# Patient Record
Sex: MALE | Race: WHITE | NOT HISPANIC OR LATINO | URBAN - METROPOLITAN AREA
[De-identification: names, ages, dates, MRNs, and addresses within clinical notes are randomized per-mention and may not be internally consistent; named-entity substitution may affect disease eponyms.]

---

## 2025-07-04 ENCOUNTER — OFFICE VISIT (OUTPATIENT)
Dept: URGENT CARE | Age: 20
End: 2025-07-04
Payer: COMMERCIAL

## 2025-07-04 VITALS
HEART RATE: 74 BPM | OXYGEN SATURATION: 98 % | TEMPERATURE: 98.4 F | RESPIRATION RATE: 16 BRPM | SYSTOLIC BLOOD PRESSURE: 144 MMHG | DIASTOLIC BLOOD PRESSURE: 80 MMHG

## 2025-07-04 DIAGNOSIS — R68.89 FLU-LIKE SYMPTOMS: ICD-10-CM

## 2025-07-04 DIAGNOSIS — J02.9 ACUTE PHARYNGITIS, UNSPECIFIED ETIOLOGY: Primary | ICD-10-CM

## 2025-07-04 LAB
POC HUMAN RHINOVIRUS PCR: NEGATIVE
POC INFLUENZA A VIRUS PCR: NEGATIVE
POC INFLUENZA B VIRUS PCR: NEGATIVE
POC RESPIRATORY SYNCYTIAL VIRUS PCR: NEGATIVE
POC SARS-COV-2 AG BINAX: NORMAL
POC STREPTOCOCCUS PYOGENES (GROUP A STREP) PCR: NEGATIVE

## 2025-07-04 RX ORDER — AMOXICILLIN 875 MG/1
875 TABLET, COATED ORAL 2 TIMES DAILY
Qty: 20 TABLET | Refills: 0 | Status: SHIPPED | OUTPATIENT
Start: 2025-07-04 | End: 2025-07-14

## 2025-07-04 ASSESSMENT — ENCOUNTER SYMPTOMS
CARDIOVASCULAR NEGATIVE: 1
FEVER: 1
SORE THROAT: 1
COUGH: 0
GASTROINTESTINAL NEGATIVE: 1

## 2025-07-04 ASSESSMENT — PAIN SCALES - GENERAL: PAINLEVEL_OUTOF10: 8

## 2025-07-04 NOTE — PROGRESS NOTES
Subjective   Patient ID: Jose G Wall is a 19 y.o. male. They present today with a chief complaint of Sore Throat (ST, fever, sinus congestion X 3 days. ).    History of Present Illness    Sore Throat   Pertinent negatives include no coughing.     Patient brought in to urgent care accompanied by mother for a chief complaint of sore throat fever and sinus pressure over the last 3 days no recorded respiratory distress no cough no vomiting or diarrhea no previous URI no known sick exposure  Past Medical History  Allergies as of 07/04/2025    (No Known Allergies)       Prescriptions Prior to Admission[1]     Medical History[2]    Surgical History[3]     reports that he has never smoked. He has never used smokeless tobacco. Alcohol use questions deferred to the physician. He reports that he does not use drugs.    Review of Systems  Review of Systems   Constitutional:  Positive for fever.   HENT:  Positive for sore throat.    Respiratory:  Negative for cough.    Cardiovascular: Negative.    Gastrointestinal: Negative.                                   Objective    Vitals:    07/04/25 1246   BP: 144/80   Pulse: 74   Resp: 16   Temp: 36.9 °C (98.4 °F)   SpO2: 98%     No LMP for male patient.    Physical Exam  Vitals and nursing note reviewed.   Constitutional:       General: He is not in acute distress.     Appearance: Normal appearance. He is not ill-appearing, toxic-appearing or diaphoretic.   HENT:      Head: Normocephalic and atraumatic.      Right Ear: Tympanic membrane normal. There is no impacted cerumen.      Left Ear: Tympanic membrane normal. There is no impacted cerumen.      Nose: Nose normal.      Mouth/Throat:      Mouth: Mucous membranes are moist.      Pharynx: Oropharyngeal exudate and posterior oropharyngeal erythema present.      Comments: Upon examination of oral cavity uvula is midline tonsils are 2+ bilaterally, unable to tell if tonsil stones versus tonsillar exudate, no audible stridor or wheeze  no muffled or garbled voice patient able to handle oral secretions speaking in full sentences  Cardiovascular:      Rate and Rhythm: Normal rate and regular rhythm.      Pulses: Normal pulses.      Heart sounds: Normal heart sounds.   Pulmonary:      Effort: Pulmonary effort is normal. No respiratory distress.      Breath sounds: Normal breath sounds.   Lymphadenopathy:      Cervical: Cervical adenopathy present.   Skin:     Findings: No rash.   Neurological:      General: No focal deficit present.      Mental Status: He is alert and oriented to person, place, and time.   Psychiatric:         Mood and Affect: Mood normal.         Behavior: Behavior normal.         Procedures    Point of Care Test & Imaging Results from this visit  Results for orders placed or performed in visit on 07/04/25   POCT SPOTFIRE R/ST Panel Mini w/Strep A (Robert Applebaum MD) manually resulted   Result Value Ref Range    POC Group A Strep, PCR Negative Negative    POC Respiratory Syncytial Virus PCR Negative Negative    POC Influenza A Virus PCR Negative Negative    POC Influenza B Virus PCR Negative Negative    POC Human Rhinovirus PCR Negative Negative   POCT Covid-19 Rapid Antigen   Result Value Ref Range    POC TATIANNA-COV-2 AG  Presumptive negative test for SARS-CoV-2 (no antigen detected)     Presumptive negative test for SARS-CoV-2 (no antigen detected)      Imaging  No results found.    Cardiology, Vascular, and Other Imaging  No other imaging results found for the past 2 days      Diagnostic study results (if any) were reviewed by Thaddeus Gray PA-C.    Assessment/Plan   Allergies, medications, history, and pertinent labs/EKGs/Imaging reviewed by Thaddeus Gray PA-C.     Medical Decision Making  I did discuss with parent and patient although in-house testing negative due to patient 4 out of 5 versus 5 out of 5 Centor's criteria as unable to tell if truly tonsillar exudate versus tonsil stones patient replaced on amoxicillin as would  cover for other etiologies of strep pharyngitis versus sinusitis did discuss possible viral etiology, did discuss importance of changing toothbrushes, use of Tylenol Motrin cough drops to help ease throat, may take Tylenol Motrin if no resolution regression of symptoms in 7 to 10 days may return to urgent care for evaluation discharge emergent care A+O x 4 stable condition no signs of distress    Orders and Diagnoses  Diagnoses and all orders for this visit:  Acute pharyngitis, unspecified etiology  -     amoxicillin (Amoxil) 875 mg tablet; Take 1 tablet (875 mg) by mouth 2 times a day for 10 days.  Flu-like symptoms  -     POCT SPOTFIRE R/ST Panel Mini w/Strep A ([employer]) manually resulted  -     POCT Covid-19 Rapid Antigen      Medical Admin Record      Patient disposition: Home    Electronically signed by Thaddeus Gray PA-C  1:11 PM           [1] (Not in a hospital admission)   [2]   Past Medical History:  Diagnosis Date    Benign lipomatous neoplasm of skin and subcutaneous tissue of trunk 07/21/2016    Lipoma of anterior chest wall    Body mass index (BMI) pediatric, 85th percentile to less than 95th percentile for age 07/17/2019    Body mass index (BMI) 85th to less than 95th percentile with athletic build, pediatric    Body mass index (BMI) pediatric, greater than or equal to 95th percentile for age 07/21/2020    BMI (body mass index), pediatric, greater than or equal to 95% for age    Encounter for routine child health examination without abnormal findings 07/21/2020    Encounter for routine child health examination without abnormal findings    Other conditions influencing health status 09/08/2016    Concussion, without loss of consciousness, initial encounter    Personal history of other diseases of the musculoskeletal system and connective tissue 07/21/2016    History of Osgood-Schlatter disease   [3]   Past Surgical History:  Procedure Laterality Date    TYMPANOSTOMY TUBE PLACEMENT  12/30/2014     Ear Pressure Equalization Tube

## 2025-07-19 ENCOUNTER — OFFICE VISIT (OUTPATIENT)
Dept: URGENT CARE | Age: 20
End: 2025-07-19
Payer: COMMERCIAL

## 2025-07-19 VITALS
RESPIRATION RATE: 14 BRPM | OXYGEN SATURATION: 99 % | SYSTOLIC BLOOD PRESSURE: 134 MMHG | DIASTOLIC BLOOD PRESSURE: 84 MMHG | TEMPERATURE: 98.2 F | HEART RATE: 76 BPM

## 2025-07-19 DIAGNOSIS — J02.9 PHARYNGITIS, UNSPECIFIED ETIOLOGY: Primary | ICD-10-CM

## 2025-07-19 LAB — POC RAPID MONO: NEGATIVE

## 2025-07-19 PROCEDURE — 99213 OFFICE O/P EST LOW 20 MIN: CPT | Performed by: STUDENT IN AN ORGANIZED HEALTH CARE EDUCATION/TRAINING PROGRAM

## 2025-07-19 PROCEDURE — 86308 HETEROPHILE ANTIBODY SCREEN: CPT | Performed by: STUDENT IN AN ORGANIZED HEALTH CARE EDUCATION/TRAINING PROGRAM

## 2025-07-19 RX ORDER — CEPHALEXIN 500 MG/1
500 CAPSULE ORAL 4 TIMES DAILY
Qty: 40 CAPSULE | Refills: 0 | Status: SHIPPED | OUTPATIENT
Start: 2025-07-19 | End: 2025-07-29

## 2025-07-19 ASSESSMENT — ENCOUNTER SYMPTOMS
FEVER: 0
CARDIOVASCULAR NEGATIVE: 1
GASTROINTESTINAL NEGATIVE: 1
FATIGUE: 0
SORE THROAT: 1
RESPIRATORY NEGATIVE: 1

## 2025-07-19 NOTE — PROGRESS NOTES
Subjective   Patient ID: Jose G Wall is a 19 y.o. male. They present today with a chief complaint of Sore Throat (X 5 days. TD-MA).    History of Present Illness    Sore Throat   Pertinent negatives include no congestion.     Patient presents to urgent care for a chief complaint of recurrent sore throat, patient states after several days of amoxicillin after previously seen did feel better over the last several days has complaint of sore throat no fever no cough no other associated symptoms  Past Medical History  Allergies as of 07/19/2025    (No Known Allergies)       Prescriptions Prior to Admission[1]     Medical History[2]    Surgical History[3]     reports that he has never smoked. He has never used smokeless tobacco. Alcohol use questions deferred to the physician. He reports that he does not use drugs.    Review of Systems  Review of Systems   Constitutional:  Negative for fatigue and fever.   HENT:  Positive for sore throat. Negative for congestion.    Respiratory: Negative.     Cardiovascular: Negative.    Gastrointestinal: Negative.                                   Objective    Vitals:    07/19/25 1207   BP: 134/84   Pulse: 76   Resp: 14   Temp: 36.8 °C (98.2 °F)   SpO2: 99%     No LMP for male patient.    Physical Exam  Vitals and nursing note reviewed.   Constitutional:       General: He is not in acute distress.     Appearance: Normal appearance. He is not ill-appearing, toxic-appearing or diaphoretic.   HENT:      Head: Normocephalic and atraumatic.      Mouth/Throat:      Mouth: Mucous membranes are moist.      Pharynx: Oropharyngeal exudate present. No posterior oropharyngeal erythema.      Comments: Tonsils are 2+ bilaterally no audible stridor or wheeze no muffled or garbled voice patient able a little or secretions speaking in full sentences uvula midline, minimal bilateral exudate    Cardiovascular:      Rate and Rhythm: Normal rate and regular rhythm.   Pulmonary:      Effort: Pulmonary  effort is normal. No respiratory distress.      Breath sounds: Normal breath sounds.   Lymphadenopathy:      Cervical: No cervical adenopathy.     Neurological:      General: No focal deficit present.      Mental Status: He is alert and oriented to person, place, and time.     Psychiatric:         Mood and Affect: Mood normal.         Behavior: Behavior normal.         Procedures    Point of Care Test & Imaging Results from this visit  Results for orders placed or performed in visit on 07/19/25   POCT Infectious mononucleosis antibody manually resulted   Result Value Ref Range    POC Rapid Mono Negative Negative      Imaging  No results found.    Cardiology, Vascular, and Other Imaging  No other imaging results found for the past 2 days      Diagnostic study results (if any) were reviewed by Thaddeus Gray PA-C.    Assessment/Plan   Allergies, medications, history, and pertinent labs/EKGs/Imaging reviewed by Thaddeus Gray PA-C.     Medical Decision Making  I did discuss previous negative strep PCR along with negative mono patient replaced on Keflex, did discuss importance of changing toothbrushes, if any audible stridor or wheeze muffled or garbled voice inability swallow solids or liquids or drooling patient is to go to the emergency room or call [phone], if no resolution or regression of symptoms after Keflex regimen patient is to follow-up with primary care or follow-up with ENT referral placed    Orders and Diagnoses  Diagnoses and all orders for this visit:  Pharyngitis, unspecified etiology  -     POCT Infectious mononucleosis antibody manually resulted  -     cephalexin (Keflex) 500 mg capsule; Take 1 capsule (500 mg) by mouth 4 times a day for 10 days.  -     Referral to ENT; Future      Medical Admin Record      Patient disposition: Home    Electronically signed by Thaddeus Gray PA-C  12:53 PM           [1] (Not in a hospital admission)   [2]   Past Medical History:  Diagnosis Date    Benign  lipomatous neoplasm of skin and subcutaneous tissue of trunk 07/21/2016    Lipoma of anterior chest wall    Body mass index (BMI) pediatric, 85th percentile to less than 95th percentile for age 07/17/2019    Body mass index (BMI) 85th to less than 95th percentile with athletic build, pediatric    Body mass index (BMI) pediatric, greater than or equal to 95th percentile for age 07/21/2020    BMI (body mass index), pediatric, greater than or equal to 95% for age    Encounter for routine child health examination without abnormal findings 07/21/2020    Encounter for routine child health examination without abnormal findings    Other conditions influencing health status 09/08/2016    Concussion, without loss of consciousness, initial encounter    Personal history of other diseases of the musculoskeletal system and connective tissue 07/21/2016    History of Osgood-Schlatter disease   [3]   Past Surgical History:  Procedure Laterality Date    TYMPANOSTOMY TUBE PLACEMENT  12/30/2014    Ear Pressure Equalization Tube

## 2025-07-19 NOTE — PATIENT INSTRUCTIONS
If any audible stridor or wheeze, muffled or garbled voice inability swallow solids or liquids or drooling please go to emergency room or call [phone], if no resolution regression of symptoms after antibiotic treatment may follow-up with ENT or primary care, referral placed to ENT may call [phone] to schedule appointment

## 2025-07-20 ENCOUNTER — TELEPHONE (OUTPATIENT)
Dept: URGENT CARE | Age: 20
End: 2025-07-20